# Patient Record
Sex: MALE | Race: WHITE
[De-identification: names, ages, dates, MRNs, and addresses within clinical notes are randomized per-mention and may not be internally consistent; named-entity substitution may affect disease eponyms.]

---

## 2018-08-29 ENCOUNTER — HOSPITAL ENCOUNTER (EMERGENCY)
Dept: HOSPITAL 46 - ED | Age: 68
Discharge: HOME | End: 2018-08-29
Payer: MEDICARE

## 2018-08-29 VITALS — BODY MASS INDEX: 43.32 KG/M2 | HEIGHT: 65 IN | WEIGHT: 259.99 LBS

## 2018-08-29 DIAGNOSIS — Z88.8: ICD-10-CM

## 2018-08-29 DIAGNOSIS — Z79.4: ICD-10-CM

## 2018-08-29 DIAGNOSIS — Z79.82: ICD-10-CM

## 2018-08-29 DIAGNOSIS — I20.9: Primary | ICD-10-CM

## 2018-08-29 DIAGNOSIS — Z79.899: ICD-10-CM

## 2018-08-29 DIAGNOSIS — Z79.84: ICD-10-CM

## 2018-08-29 NOTE — EKG
[facility]
                                    [address] [facility]
                                  [place]  [id number]
_________________________________________________________________________________________
                                                                 Signed   
 
 
Sinus rhythm with 1st degree AV block
Nonspecific intraventricular block
Inferior infarct , age undetermined
Anterolateral infarct , age undetermined
Abnormal ECG
 
Confirmed by CICI CONNER MD ([id number]) on 8/29/2018 4:34:26 PM
 
 
 
 
 
 
 
 
 
 
 
 
 
 
 
 
 
 
 
 
 
 
 
 
 
 
 
 
 
 
 
 
 
 
 
 
 
 
    Electronically Signed By: CICI CONNER MD  08/29/18 1634
_________________________________________________________________________________________
PATIENT NAME:     DOUGLAS,BRUCE DUANE                   
MEDICAL RECORD #: [id number]                     Electrocardiogram             
          ACCT #: [id number]  
DATE OF BIRTH:   [birth year]                                       
PHYSICIAN:   CICI CONNER MD                   REPORT #: [id number]
REPORT IS CONFIDENTIAL AND NOT TO BE RELEASED WITHOUT AUTHORIZATION

## 2018-08-29 NOTE — EKG
[facility]
                                    [address] [facility]
                                  [place]  [id number]
_________________________________________________________________________________________
                                                                 Signed   
 
 
Sinus rhythm with 1st degree AV block
Inferior infarct (cited on or before 29-AUG-2018)
Anterolateral infarct (cited on or before 29-AUG-2018)
Abnormal ECG
When compared with ECG of 29-AUG-2018 09:27, (Unconfirmed)
QRS axis shifted right
Confirmed by CICI CONNER MD ([id number]) on 8/29/2018 4:35:08 PM
 
 
 
 
 
 
 
 
 
 
 
 
 
 
 
 
 
 
 
 
 
 
 
 
 
 
 
 
 
 
 
 
 
 
 
 
 
 
    Electronically Signed By: CICI CONNER MD  08/29/18 1635
_________________________________________________________________________________________
PATIENT NAME:     STEARNS,BRUCE DUANE                   
MEDICAL RECORD #: [id number]                     Electrocardiogram             
          ACCT #: [id number]  
DATE OF BIRTH:   [birth year]                                       
PHYSICIAN:   CICI CONNER MD                   REPORT #: [id number]
REPORT IS CONFIDENTIAL AND NOT TO BE RELEASED WITHOUT AUTHORIZATION